# Patient Record
Sex: MALE | Race: WHITE
[De-identification: names, ages, dates, MRNs, and addresses within clinical notes are randomized per-mention and may not be internally consistent; named-entity substitution may affect disease eponyms.]

---

## 2018-01-16 ENCOUNTER — HOSPITAL ENCOUNTER (EMERGENCY)
Dept: HOSPITAL 12 - ER | Age: 23
Discharge: HOME | End: 2018-01-16
Payer: SELF-PAY

## 2018-01-16 VITALS — WEIGHT: 170 LBS | BODY MASS INDEX: 23.03 KG/M2 | HEIGHT: 72 IN

## 2018-01-16 DIAGNOSIS — L02.413: Primary | ICD-10-CM

## 2018-01-16 PROCEDURE — A4663 DIALYSIS BLOOD PRESSURE CUFF: HCPCS

## 2018-01-16 NOTE — NUR
Wound bandaged with 4x4s and roller gauze.



Gave pt RX and d/c instructions, verbalized understanding.

## 2019-01-31 ENCOUNTER — HOSPITAL ENCOUNTER (EMERGENCY)
Dept: HOSPITAL 12 - ER | Age: 24
Discharge: HOME | End: 2019-01-31
Payer: SELF-PAY

## 2019-01-31 VITALS — HEIGHT: 72 IN | BODY MASS INDEX: 20.99 KG/M2 | WEIGHT: 155 LBS

## 2019-01-31 DIAGNOSIS — L02.416: ICD-10-CM

## 2019-01-31 DIAGNOSIS — F11.10: ICD-10-CM

## 2019-01-31 DIAGNOSIS — F15.10: ICD-10-CM

## 2019-01-31 DIAGNOSIS — L03.116: ICD-10-CM

## 2019-01-31 DIAGNOSIS — L03.115: Primary | ICD-10-CM

## 2019-01-31 DIAGNOSIS — J02.9: ICD-10-CM

## 2019-01-31 PROCEDURE — 86403 PARTICLE AGGLUT ANTBDY SCRN: CPT

## 2019-01-31 PROCEDURE — 10060 I&D ABSCESS SIMPLE/SINGLE: CPT

## 2019-01-31 PROCEDURE — 99283 EMERGENCY DEPT VISIT LOW MDM: CPT

## 2019-01-31 PROCEDURE — A4663 DIALYSIS BLOOD PRESSURE CUFF: HCPCS

## 2019-01-31 PROCEDURE — 96372 THER/PROPH/DIAG INJ SC/IM: CPT

## 2019-01-31 PROCEDURE — 87400 INFLUENZA A/B EACH AG IA: CPT

## 2019-01-31 PROCEDURE — 36415 COLL VENOUS BLD VENIPUNCTURE: CPT

## 2019-01-31 NOTE — NUR
Patient does not wish to proceed with medical care recommended by Dr. malik  
).  Patient given information related to possible complications, up to and 
including death, which could occur as a result of leaving the hospital at this 
time.  Patient verbalizes understanding of risks involved due to leaving 
against medical advice.  Patient has signed AMA form.pt walks in steady gait. 
pt accompanied by mother.

## 2023-07-16 ENCOUNTER — HOSPITAL ENCOUNTER (EMERGENCY)
Dept: HOSPITAL 12 - ER | Age: 28
Discharge: HOME | End: 2023-07-16
Payer: COMMERCIAL

## 2023-07-16 VITALS — BODY MASS INDEX: 21.67 KG/M2 | WEIGHT: 160 LBS | HEIGHT: 72 IN

## 2023-07-16 DIAGNOSIS — F15.10: ICD-10-CM

## 2023-07-16 DIAGNOSIS — L03.114: Primary | ICD-10-CM

## 2023-07-16 DIAGNOSIS — Z79.899: ICD-10-CM

## 2023-07-16 DIAGNOSIS — F17.210: ICD-10-CM

## 2023-07-16 PROCEDURE — A4663 DIALYSIS BLOOD PRESSURE CUFF: HCPCS

## 2023-07-16 PROCEDURE — 99283 EMERGENCY DEPT VISIT LOW MDM: CPT

## 2023-07-16 PROCEDURE — 96372 THER/PROPH/DIAG INJ SC/IM: CPT
